# Patient Record
Sex: MALE | Race: ASIAN | NOT HISPANIC OR LATINO | URBAN - METROPOLITAN AREA
[De-identification: names, ages, dates, MRNs, and addresses within clinical notes are randomized per-mention and may not be internally consistent; named-entity substitution may affect disease eponyms.]

---

## 2021-09-25 ENCOUNTER — INPATIENT (INPATIENT)
Facility: HOSPITAL | Age: 66
LOS: 3 days | Discharge: HOME | End: 2021-09-29
Attending: INTERNAL MEDICINE | Admitting: INTERNAL MEDICINE
Payer: MEDICAID

## 2021-09-25 VITALS
DIASTOLIC BLOOD PRESSURE: 90 MMHG | HEART RATE: 70 BPM | OXYGEN SATURATION: 99 % | SYSTOLIC BLOOD PRESSURE: 160 MMHG | TEMPERATURE: 98 F | RESPIRATION RATE: 15 BRPM

## 2021-09-25 DIAGNOSIS — K21.9 GASTRO-ESOPHAGEAL REFLUX DISEASE WITHOUT ESOPHAGITIS: ICD-10-CM

## 2021-09-25 DIAGNOSIS — E78.5 HYPERLIPIDEMIA, UNSPECIFIED: ICD-10-CM

## 2021-09-25 DIAGNOSIS — Z29.9 ENCOUNTER FOR PROPHYLACTIC MEASURES, UNSPECIFIED: ICD-10-CM

## 2021-09-25 DIAGNOSIS — E11.65 TYPE 2 DIABETES MELLITUS WITH HYPERGLYCEMIA: ICD-10-CM

## 2021-09-25 DIAGNOSIS — R07.9 CHEST PAIN, UNSPECIFIED: ICD-10-CM

## 2021-09-25 DIAGNOSIS — I10 ESSENTIAL (PRIMARY) HYPERTENSION: ICD-10-CM

## 2021-09-25 LAB
ALBUMIN SERPL ELPH-MCNC: 4.4 G/DL — SIGNIFICANT CHANGE UP (ref 3.3–5)
ALP SERPL-CCNC: 52 U/L — SIGNIFICANT CHANGE UP (ref 40–120)
ALT FLD-CCNC: 25 U/L — SIGNIFICANT CHANGE UP (ref 4–41)
ANION GAP SERPL CALC-SCNC: 14 MMOL/L — SIGNIFICANT CHANGE UP (ref 7–14)
APTT BLD: 38.3 SEC — HIGH (ref 27–36.3)
AST SERPL-CCNC: 27 U/L — SIGNIFICANT CHANGE UP (ref 4–40)
BASOPHILS # BLD AUTO: 0.02 K/UL — SIGNIFICANT CHANGE UP (ref 0–0.2)
BASOPHILS NFR BLD AUTO: 0.3 % — SIGNIFICANT CHANGE UP (ref 0–2)
BILIRUB SERPL-MCNC: 0.3 MG/DL — SIGNIFICANT CHANGE UP (ref 0.2–1.2)
BUN SERPL-MCNC: 15 MG/DL — SIGNIFICANT CHANGE UP (ref 7–23)
CALCIUM SERPL-MCNC: 9.5 MG/DL — SIGNIFICANT CHANGE UP (ref 8.4–10.5)
CHLORIDE SERPL-SCNC: 102 MMOL/L — SIGNIFICANT CHANGE UP (ref 98–107)
CO2 SERPL-SCNC: 23 MMOL/L — SIGNIFICANT CHANGE UP (ref 22–31)
CREAT SERPL-MCNC: 1.14 MG/DL — SIGNIFICANT CHANGE UP (ref 0.5–1.3)
EOSINOPHIL # BLD AUTO: 0.06 K/UL — SIGNIFICANT CHANGE UP (ref 0–0.5)
EOSINOPHIL NFR BLD AUTO: 0.8 % — SIGNIFICANT CHANGE UP (ref 0–6)
GLUCOSE BLDC GLUCOMTR-MCNC: 95 MG/DL — SIGNIFICANT CHANGE UP (ref 70–99)
GLUCOSE SERPL-MCNC: 109 MG/DL — HIGH (ref 70–99)
HCT VFR BLD CALC: 46.5 % — SIGNIFICANT CHANGE UP (ref 39–50)
HGB BLD-MCNC: 15.4 G/DL — SIGNIFICANT CHANGE UP (ref 13–17)
IANC: 5.6 K/UL — SIGNIFICANT CHANGE UP (ref 1.5–8.5)
IMM GRANULOCYTES NFR BLD AUTO: 0.3 % — SIGNIFICANT CHANGE UP (ref 0–1.5)
INR BLD: 1 RATIO — SIGNIFICANT CHANGE UP (ref 0.88–1.16)
LYMPHOCYTES # BLD AUTO: 1.55 K/UL — SIGNIFICANT CHANGE UP (ref 1–3.3)
LYMPHOCYTES # BLD AUTO: 19.7 % — SIGNIFICANT CHANGE UP (ref 13–44)
MCHC RBC-ENTMCNC: 28.6 PG — SIGNIFICANT CHANGE UP (ref 27–34)
MCHC RBC-ENTMCNC: 33.1 GM/DL — SIGNIFICANT CHANGE UP (ref 32–36)
MCV RBC AUTO: 86.4 FL — SIGNIFICANT CHANGE UP (ref 80–100)
MONOCYTES # BLD AUTO: 0.61 K/UL — SIGNIFICANT CHANGE UP (ref 0–0.9)
MONOCYTES NFR BLD AUTO: 7.8 % — SIGNIFICANT CHANGE UP (ref 2–14)
NEUTROPHILS # BLD AUTO: 5.6 K/UL — SIGNIFICANT CHANGE UP (ref 1.8–7.4)
NEUTROPHILS NFR BLD AUTO: 71.1 % — SIGNIFICANT CHANGE UP (ref 43–77)
NRBC # BLD: 0 /100 WBCS — SIGNIFICANT CHANGE UP
NRBC # FLD: 0 K/UL — SIGNIFICANT CHANGE UP
NT-PROBNP SERPL-SCNC: 168 PG/ML — SIGNIFICANT CHANGE UP
PLATELET # BLD AUTO: 291 K/UL — SIGNIFICANT CHANGE UP (ref 150–400)
POTASSIUM SERPL-MCNC: 4.3 MMOL/L — SIGNIFICANT CHANGE UP (ref 3.5–5.3)
POTASSIUM SERPL-SCNC: 4.3 MMOL/L — SIGNIFICANT CHANGE UP (ref 3.5–5.3)
PROT SERPL-MCNC: 7.4 G/DL — SIGNIFICANT CHANGE UP (ref 6–8.3)
PROTHROM AB SERPL-ACNC: 11.5 SEC — SIGNIFICANT CHANGE UP (ref 10.6–13.6)
RBC # BLD: 5.38 M/UL — SIGNIFICANT CHANGE UP (ref 4.2–5.8)
RBC # FLD: 13.7 % — SIGNIFICANT CHANGE UP (ref 10.3–14.5)
SARS-COV-2 RNA SPEC QL NAA+PROBE: SIGNIFICANT CHANGE UP
SODIUM SERPL-SCNC: 139 MMOL/L — SIGNIFICANT CHANGE UP (ref 135–145)
TROPONIN T, HIGH SENSITIVITY RESULT: 26 NG/L — SIGNIFICANT CHANGE UP
TROPONIN T, HIGH SENSITIVITY RESULT: 26 NG/L — SIGNIFICANT CHANGE UP
WBC # BLD: 8.07 K/UL — SIGNIFICANT CHANGE UP (ref 3.8–10.5)
WBC # FLD AUTO: 8.07 K/UL — SIGNIFICANT CHANGE UP (ref 3.8–10.5)

## 2021-09-25 PROCEDURE — 99223 1ST HOSP IP/OBS HIGH 75: CPT

## 2021-09-25 PROCEDURE — 93010 ELECTROCARDIOGRAM REPORT: CPT

## 2021-09-25 PROCEDURE — 71045 X-RAY EXAM CHEST 1 VIEW: CPT | Mod: 26

## 2021-09-25 PROCEDURE — 99285 EMERGENCY DEPT VISIT HI MDM: CPT | Mod: 25

## 2021-09-25 RX ORDER — ASPIRIN/CALCIUM CARB/MAGNESIUM 324 MG
162 TABLET ORAL ONCE
Refills: 0 | Status: COMPLETED | OUTPATIENT
Start: 2021-09-25 | End: 2021-09-25

## 2021-09-25 RX ORDER — SODIUM CHLORIDE 9 MG/ML
1000 INJECTION, SOLUTION INTRAVENOUS
Refills: 0 | Status: DISCONTINUED | OUTPATIENT
Start: 2021-09-25 | End: 2021-09-29

## 2021-09-25 RX ORDER — FAMOTIDINE 10 MG/ML
20 INJECTION INTRAVENOUS
Refills: 0 | Status: DISCONTINUED | OUTPATIENT
Start: 2021-09-25 | End: 2021-09-29

## 2021-09-25 RX ORDER — INSULIN LISPRO 100/ML
VIAL (ML) SUBCUTANEOUS AT BEDTIME
Refills: 0 | Status: DISCONTINUED | OUTPATIENT
Start: 2021-09-25 | End: 2021-09-26

## 2021-09-25 RX ORDER — DEXTROSE 50 % IN WATER 50 %
15 SYRINGE (ML) INTRAVENOUS ONCE
Refills: 0 | Status: DISCONTINUED | OUTPATIENT
Start: 2021-09-25 | End: 2021-09-29

## 2021-09-25 RX ORDER — ASPIRIN/CALCIUM CARB/MAGNESIUM 324 MG
81 TABLET ORAL AT BEDTIME
Refills: 0 | Status: DISCONTINUED | OUTPATIENT
Start: 2021-09-26 | End: 2021-09-29

## 2021-09-25 RX ORDER — ATORVASTATIN CALCIUM 80 MG/1
40 TABLET, FILM COATED ORAL AT BEDTIME
Refills: 0 | Status: DISCONTINUED | OUTPATIENT
Start: 2021-09-25 | End: 2021-09-27

## 2021-09-25 RX ORDER — ACETAMINOPHEN 500 MG
650 TABLET ORAL EVERY 6 HOURS
Refills: 0 | Status: DISCONTINUED | OUTPATIENT
Start: 2021-09-25 | End: 2021-09-29

## 2021-09-25 RX ORDER — DEXTROSE 50 % IN WATER 50 %
25 SYRINGE (ML) INTRAVENOUS ONCE
Refills: 0 | Status: DISCONTINUED | OUTPATIENT
Start: 2021-09-25 | End: 2021-09-29

## 2021-09-25 RX ORDER — ASPIRIN/CALCIUM CARB/MAGNESIUM 324 MG
1 TABLET ORAL
Qty: 0 | Refills: 0 | DISCHARGE

## 2021-09-25 RX ORDER — ENOXAPARIN SODIUM 100 MG/ML
40 INJECTION SUBCUTANEOUS DAILY
Refills: 0 | Status: DISCONTINUED | OUTPATIENT
Start: 2021-09-25 | End: 2021-09-29

## 2021-09-25 RX ORDER — INFLUENZA VIRUS VACCINE 15; 15; 15; 15 UG/.5ML; UG/.5ML; UG/.5ML; UG/.5ML
0.5 SUSPENSION INTRAMUSCULAR ONCE
Refills: 0 | Status: DISCONTINUED | OUTPATIENT
Start: 2021-09-25 | End: 2021-09-29

## 2021-09-25 RX ORDER — DEXTROSE 50 % IN WATER 50 %
12.5 SYRINGE (ML) INTRAVENOUS ONCE
Refills: 0 | Status: DISCONTINUED | OUTPATIENT
Start: 2021-09-25 | End: 2021-09-29

## 2021-09-25 RX ORDER — INSULIN LISPRO 100/ML
VIAL (ML) SUBCUTANEOUS
Refills: 0 | Status: DISCONTINUED | OUTPATIENT
Start: 2021-09-25 | End: 2021-09-26

## 2021-09-25 RX ORDER — GLUCAGON INJECTION, SOLUTION 0.5 MG/.1ML
1 INJECTION, SOLUTION SUBCUTANEOUS ONCE
Refills: 0 | Status: DISCONTINUED | OUTPATIENT
Start: 2021-09-25 | End: 2021-09-29

## 2021-09-25 RX ORDER — ATENOLOL 25 MG/1
50 TABLET ORAL DAILY
Refills: 0 | Status: DISCONTINUED | OUTPATIENT
Start: 2021-09-25 | End: 2021-09-29

## 2021-09-25 RX ORDER — LOSARTAN POTASSIUM 100 MG/1
50 TABLET, FILM COATED ORAL DAILY
Refills: 0 | Status: DISCONTINUED | OUTPATIENT
Start: 2021-09-25 | End: 2021-09-29

## 2021-09-25 RX ADMIN — Medication 162 MILLIGRAM(S): at 15:57

## 2021-09-25 NOTE — H&P ADULT - NSHPREVIEWOFSYSTEMS_GEN_ALL_CORE
ROS:    Constitutional: [ ] fevers [ ] chills   HEENT: [ ] dry eyes [ ] eye irritation [ ] postnasal drip   CV: [x ] chest pain [ ] orthopnea [ ] palpitations   Resp: [ ] cough [ ] shortness of breath [ ] dyspnea   GI: [ ] nausea [ ] vomiting [ ] diarrhea [ ] constipation [ ] abd pain   : [ ] dysuria [ ] nocturia [ ] hematuria [ ] increased urinary frequency  Musculoskeletal: [ ] back pain [ ] myalgias [ ] arthralgias [ ] fracture  Skin: [ ] rash [ ] itch  Neurological: [ ] headache [ ] dizziness [ ] syncope   Endocrine: [ ] diabetes [ ] thyroid problem  Hematologic/Lymphatic: [ ] anemia [ ] bleeding problem  Allergic/Immunologic: [ ] itchy eyes [ ] nasal discharge   [ x] All other systems negative

## 2021-09-25 NOTE — ED PROVIDER NOTE - ATTENDING CONTRIBUTION TO CARE
66M p/w SSCP worse on exertion and rad down L arm, impv with rest.  no fever or cough.  Reports had ST years ago in [facility].  PMHX DM HTN HLD.  No PARADISE on EKG.  Pt not in distress.  However given typical symtpoms and multiple risk factors will admit for ACS eval.  VS:  unremarkable except HTN    GEN - NAD;   well appearing;   A+O x3   HEAD - NC/AT     ENT - PEERL, EOMI, mucous membranes    moist , no discharge      NECK: Neck supple, non-tender without lymphadenopathy, no masses, no JVD  PULM - CTA b/l,  symmetric breath sounds  COR -  normal heart sounds    ABD - , ND, NT, soft,  BACK - no CVA tenderness, nontender spine     EXTREMS - no edema, no deformity, warm and well perfused    SKIN - no rash    or bruising      NEUROLOGIC - alert, face symmetric, speech fluent, sensation nl, motor no focal deficit.

## 2021-09-25 NOTE — H&P ADULT - NSHPLABSRESULTS_GEN_ALL_CORE
I have personally reviewed this patient's labs below:                        15.4   8.07  )-----------( 291      ( 25 Sep 2021 15:52 )             46.5     09-25-21 @ 15:55    139  |  102  |  15             --------------------------< 109<H>     4.3  |  23  | 1.14    eGFR AA: 78  eGFR N-AA: 68    Calcium: 9.5  Phosphorus: --  Magnesium: --    AST: 27    ALT: 25  AlkPhos: 52  Protein: 7.4  Albumin: 4.4  TBili: 0.3  D-Bili: --      Troponin 26--26  Probnp 168  COVID negative    I have personally reviewed this patient's EKG and my independent interpretation is NSR, LVH, biphasic T waves in V2-V5    I have personally reviewed this patient's CXR and my independent interpretation is clear lungs

## 2021-09-25 NOTE — ED PROVIDER NOTE - PROGRESS NOTE DETAILS
Simon Gaytan, PGY3: Initial trop 26, delta trop ordered. Patient endorsed to Dr. RAMON Moran (tele doc of the day). Patient admitted to tele.

## 2021-09-25 NOTE — H&P ADULT - PROBLEM SELECTOR PLAN 1
Typical chest pain with exertion and relieved with rest. Has indeterminant troponins but without significant delta. EKG NSR, LVH, biphasic T waves in V2-V5  Given comorbid conditions age, male, HTN, HLD, DM will require ischemic evaluation  -monitor on tele  -TTE  -ischemic evaluation per primary cardiology team in AM. Likely will require stress test  -add on A1C, TSH, lipids

## 2021-09-25 NOTE — ED PROVIDER NOTE - PHYSICAL EXAMINATION
GENERAL: A&Ox3, non-toxic appearing, no acute distress  HEENT: NCAT, EOMI, oral mucosa moist, normal conjunctiva  RESP: CTAB, no respiratory distress, no wheezes/rhonchi/rales, speaking in full sentences  CV: RRR, no murmurs/rubs/gallops, no JVD, no peripheral edema  ABDOMEN: soft, non-tender, non-distended, no guarding  MSK: no visible deformities, no chest wall tenderness  NEURO: no focal sensory or motor deficits, CN 2-12 grossly intact  SKIN: warm, normal color, well perfused, no rash  PSYCH: normal affect

## 2021-09-25 NOTE — H&P ADULT - NSHPPHYSICALEXAM_GEN_ALL_CORE
PHYSICAL EXAM:  Vital Signs Last 24 Hrs  T(C): 36.8 (09-25-21 @ 19:05)  T(F): 98.2 (09-25-21 @ 19:05), Max: 98.2 (09-25-21 @ 19:05)  HR: 74 (09-25-21 @ 19:05) (70 - 81)  BP: 155/86 (09-25-21 @ 19:05)  BP(mean): 105 (09-25-21 @ 19:05) (105 - 105)  RR: 19 (09-25-21 @ 19:05) (15 - 19)  SpO2: 100% (09-25-21 @ 19:05) (99% - 100%)  Wt(kg): --    Constitutional: NAD, awake and alert, well developed  EYES: EOMI, conjunctiva clear  ENT:  Normal Hearing, no tonsillar exudates   Neck: Soft and supple , no thyromegaly   Respiratory: Breath sounds are clear bilaterally, No wheezing, rales or rhonchi, no tachypnea, no accessory muscle use  Cardiovascular: S1 and S2, regular rate and rhythm, no Murmurs, gallops or rubs, no JVD, no leg edema  Gastrointestinal: Bowel Sounds present, soft, nontender, nondistended, no guarding, no rebound  Extremities: No cyanosis or clubbing; warm to touch  Vascular: 2+ peripheral pulses lower ex  Neurological: No focal deficits, CN II-XII intact bilaterally, sensation to light touch intact in all extremities. Pupils are equally reactive to light and symmetrical in size.   Musculoskeletal: 5/5 strength b/l upper and lower extremities; no joint swelling.  Skin: No rashes, no ulcerations

## 2021-09-25 NOTE — H&P ADULT - NSICDXPASTMEDICALHX_GEN_ALL_CORE_FT
PAST MEDICAL HISTORY:  Diabetes mellitus     GERD (gastroesophageal reflux disease)     HLD (hyperlipidemia)     HTN (hypertension)     
Asthma    HPV in female    Hypertension    Infertility, female    Morbid obesity    Polyp of female genital tract    Seasonal allergies    Uterine polyp

## 2021-09-25 NOTE — ED PROVIDER NOTE - CLINICAL SUMMARY MEDICAL DECISION MAKING FREE TEXT BOX
Simon Gaytan, PGY3: 66 year old male p/w exertional dyspnea and chest pain x2 days, relieved w/ rest. Non-pleuritic. No associated sx. Last stress many years ago, does not follow w/ cards. Hypertensive, ekg non-ischemic. C/f unstable angina. Plan for labs, cxr, admit for stress vs cath.

## 2021-09-25 NOTE — H&P ADULT - HISTORY OF PRESENT ILLNESS
66M with PMHx HTN, HLD, DM controlled on metformin, GERD presenting with chest pain x4 days. Chest pain is primarily exertional, comes and goes, substernal and described as a burning sensation. This feels different than usual acid reflux. Onset of chest pain is about 5-10 minutes after walking. It then takes about 5 minutes to subside with rest or lying down. Chest pain is mild, radiates to left arm as well. Denies prior history of CP, MI, stroke, arrhythmia, CHF. States last stress was 20 years ago. Denies LEIJA, SOB, LE edema, orthopnea, LOC, LH. Patient saw PMD today who sent him in for further w/u of chest pain    In ED, trops 26--26. EKG NSR, LVH, biphasic V2-V5  Chest pain now resolved

## 2021-09-25 NOTE — ED PROVIDER NOTE - OBJECTIVE STATEMENT
66 year old male PMH HTN, HLD, DM, presents to ED for chest pain. Patient states when walking short distances, he is experiencing mid-sternal chest "burning" pain that radiates down L arm. Pain will relieve itself upon resting. This symptom is new as of the past 2 days. Pain is non-pleuritic. He had stress test "many years ago" in [facility], does not follow with cardiology. He denies fever, chills, cough, abd pain, n/v, or back pain.

## 2021-09-25 NOTE — ED ADULT NURSE NOTE - OBJECTIVE STATEMENT
Pt received to room trauma C. Pt with hx of HTN, HLD, DM, c/o dyspnea & chest pain on exertion since this past Wednesday. States a "burning chest pain" feeling starts when he is "having a lot of movement and working hard," resolves after resting. States he does not feel pain when sitting, standing in place, and sleeping. Respirations are even & unlabored, lung sounds clear, heart sounds normal, abdomen is soft, non-distended. Pt currently chest pain & dyspnea (at this time), fevers, chills, weakness, dizziness, headache, palpitations, cough. 20 G IV Placed to right AC. Pt is A&Ox4, ambulates independently.

## 2021-09-25 NOTE — ED PROVIDER NOTE - NSICDXPASTMEDICALHX_GEN_ALL_CORE_FT
PAST MEDICAL HISTORY:  Diabetes mellitus     GERD (gastroesophageal reflux disease)     HLD (hyperlipidemia)     HTN (hypertension)

## 2021-09-25 NOTE — H&P ADULT - ASSESSMENT
66M with PMHx HTN, HLD, DM controlled on metformin, GERD presenting with chest pain x4 days. Exertional with typical symptoms.

## 2021-09-25 NOTE — ED ADULT NURSE NOTE - CHIEF COMPLAINT QUOTE
Please notify that child's COVID-19 test was NEGATIVE, continue quarantine and asthma treatment for rest of this week and RTC if not improved pt c/o exertional chest pain x 3 days, radiating to the L arm.

## 2021-09-26 LAB
A1C WITH ESTIMATED AVERAGE GLUCOSE RESULT: 6.1 % — HIGH (ref 4–5.6)
CHOLEST SERPL-MCNC: 169 MG/DL — SIGNIFICANT CHANGE UP
COVID-19 SPIKE DOMAIN AB INTERP: POSITIVE
COVID-19 SPIKE DOMAIN ANTIBODY RESULT: >250 U/ML — HIGH
ESTIMATED AVERAGE GLUCOSE: 128 — SIGNIFICANT CHANGE UP
GLUCOSE BLDC GLUCOMTR-MCNC: 103 MG/DL — HIGH (ref 70–99)
GLUCOSE BLDC GLUCOMTR-MCNC: 108 MG/DL — HIGH (ref 70–99)
GLUCOSE BLDC GLUCOMTR-MCNC: 90 MG/DL — SIGNIFICANT CHANGE UP (ref 70–99)
GLUCOSE BLDC GLUCOMTR-MCNC: 93 MG/DL — SIGNIFICANT CHANGE UP (ref 70–99)
HDLC SERPL-MCNC: 55 MG/DL — SIGNIFICANT CHANGE UP
LIPID PNL WITH DIRECT LDL SERPL: 94 MG/DL — SIGNIFICANT CHANGE UP
NON HDL CHOLESTEROL: 114 MG/DL — SIGNIFICANT CHANGE UP
NT-PROBNP SERPL-SCNC: 179 PG/ML — SIGNIFICANT CHANGE UP
SARS-COV-2 IGG+IGM SERPL QL IA: >250 U/ML — HIGH
SARS-COV-2 IGG+IGM SERPL QL IA: POSITIVE
TRIGL SERPL-MCNC: 101 MG/DL — SIGNIFICANT CHANGE UP
TSH SERPL-MCNC: 2.22 UIU/ML — SIGNIFICANT CHANGE UP (ref 0.27–4.2)

## 2021-09-26 RX ORDER — INSULIN LISPRO 100/ML
VIAL (ML) SUBCUTANEOUS EVERY 6 HOURS
Refills: 0 | Status: DISCONTINUED | OUTPATIENT
Start: 2021-09-26 | End: 2021-09-27

## 2021-09-26 RX ADMIN — Medication 81 MILLIGRAM(S): at 22:03

## 2021-09-26 RX ADMIN — LOSARTAN POTASSIUM 50 MILLIGRAM(S): 100 TABLET, FILM COATED ORAL at 05:27

## 2021-09-26 RX ADMIN — FAMOTIDINE 20 MILLIGRAM(S): 10 INJECTION INTRAVENOUS at 16:44

## 2021-09-26 RX ADMIN — ENOXAPARIN SODIUM 40 MILLIGRAM(S): 100 INJECTION SUBCUTANEOUS at 09:33

## 2021-09-26 RX ADMIN — FAMOTIDINE 20 MILLIGRAM(S): 10 INJECTION INTRAVENOUS at 05:28

## 2021-09-26 RX ADMIN — ATENOLOL 50 MILLIGRAM(S): 25 TABLET ORAL at 05:27

## 2021-09-26 RX ADMIN — ATORVASTATIN CALCIUM 40 MILLIGRAM(S): 80 TABLET, FILM COATED ORAL at 22:03

## 2021-09-27 LAB
ANION GAP SERPL CALC-SCNC: 14 MMOL/L — SIGNIFICANT CHANGE UP (ref 7–14)
BUN SERPL-MCNC: 19 MG/DL — SIGNIFICANT CHANGE UP (ref 7–23)
CALCIUM SERPL-MCNC: 9.9 MG/DL — SIGNIFICANT CHANGE UP (ref 8.4–10.5)
CHLORIDE SERPL-SCNC: 100 MMOL/L — SIGNIFICANT CHANGE UP (ref 98–107)
CO2 SERPL-SCNC: 24 MMOL/L — SIGNIFICANT CHANGE UP (ref 22–31)
CREAT SERPL-MCNC: 1.1 MG/DL — SIGNIFICANT CHANGE UP (ref 0.5–1.3)
GLUCOSE BLDC GLUCOMTR-MCNC: 102 MG/DL — HIGH (ref 70–99)
GLUCOSE BLDC GLUCOMTR-MCNC: 102 MG/DL — HIGH (ref 70–99)
GLUCOSE BLDC GLUCOMTR-MCNC: 105 MG/DL — HIGH (ref 70–99)
GLUCOSE BLDC GLUCOMTR-MCNC: 106 MG/DL — HIGH (ref 70–99)
GLUCOSE BLDC GLUCOMTR-MCNC: 85 MG/DL — SIGNIFICANT CHANGE UP (ref 70–99)
GLUCOSE BLDC GLUCOMTR-MCNC: 99 MG/DL — SIGNIFICANT CHANGE UP (ref 70–99)
GLUCOSE SERPL-MCNC: 116 MG/DL — HIGH (ref 70–99)
HCT VFR BLD CALC: 48.6 % — SIGNIFICANT CHANGE UP (ref 39–50)
HCV AB S/CO SERPL IA: 0.42 S/CO — SIGNIFICANT CHANGE UP (ref 0–0.99)
HCV AB SERPL-IMP: SIGNIFICANT CHANGE UP
HGB BLD-MCNC: 15.9 G/DL — SIGNIFICANT CHANGE UP (ref 13–17)
MCHC RBC-ENTMCNC: 28.5 PG — SIGNIFICANT CHANGE UP (ref 27–34)
MCHC RBC-ENTMCNC: 32.7 GM/DL — SIGNIFICANT CHANGE UP (ref 32–36)
MCV RBC AUTO: 87.3 FL — SIGNIFICANT CHANGE UP (ref 80–100)
NRBC # BLD: 0 /100 WBCS — SIGNIFICANT CHANGE UP
NRBC # FLD: 0 K/UL — SIGNIFICANT CHANGE UP
PLATELET # BLD AUTO: 285 K/UL — SIGNIFICANT CHANGE UP (ref 150–400)
POTASSIUM SERPL-MCNC: 5.3 MMOL/L — SIGNIFICANT CHANGE UP (ref 3.5–5.3)
POTASSIUM SERPL-SCNC: 5.3 MMOL/L — SIGNIFICANT CHANGE UP (ref 3.5–5.3)
RBC # BLD: 5.57 M/UL — SIGNIFICANT CHANGE UP (ref 4.2–5.8)
RBC # FLD: 13.9 % — SIGNIFICANT CHANGE UP (ref 10.3–14.5)
SODIUM SERPL-SCNC: 138 MMOL/L — SIGNIFICANT CHANGE UP (ref 135–145)
WBC # BLD: 8.15 K/UL — SIGNIFICANT CHANGE UP (ref 3.8–10.5)
WBC # FLD AUTO: 8.15 K/UL — SIGNIFICANT CHANGE UP (ref 3.8–10.5)

## 2021-09-27 PROCEDURE — 99152 MOD SED SAME PHYS/QHP 5/>YRS: CPT

## 2021-09-27 PROCEDURE — 92978 ENDOLUMINL IVUS OCT C 1ST: CPT | Mod: 26,LD

## 2021-09-27 PROCEDURE — 93458 L HRT ARTERY/VENTRICLE ANGIO: CPT | Mod: 26,59

## 2021-09-27 PROCEDURE — 92941 PRQ TRLML REVSC TOT OCCL AMI: CPT | Mod: LD

## 2021-09-27 PROCEDURE — 93010 ELECTROCARDIOGRAM REPORT: CPT

## 2021-09-27 RX ORDER — TICAGRELOR 90 MG/1
1 TABLET ORAL
Qty: 60 | Refills: 0
Start: 2021-09-27 | End: 2021-10-26

## 2021-09-27 RX ORDER — TICAGRELOR 90 MG/1
90 TABLET ORAL EVERY 12 HOURS
Refills: 0 | Status: DISCONTINUED | OUTPATIENT
Start: 2021-09-28 | End: 2021-09-29

## 2021-09-27 RX ORDER — ATORVASTATIN CALCIUM 80 MG/1
80 TABLET, FILM COATED ORAL AT BEDTIME
Refills: 0 | Status: DISCONTINUED | OUTPATIENT
Start: 2021-09-27 | End: 2021-09-29

## 2021-09-27 RX ORDER — INSULIN LISPRO 100/ML
VIAL (ML) SUBCUTANEOUS
Refills: 0 | Status: DISCONTINUED | OUTPATIENT
Start: 2021-09-27 | End: 2021-09-29

## 2021-09-27 RX ADMIN — LOSARTAN POTASSIUM 50 MILLIGRAM(S): 100 TABLET, FILM COATED ORAL at 05:42

## 2021-09-27 RX ADMIN — Medication 650 MILLIGRAM(S): at 22:06

## 2021-09-27 RX ADMIN — ATENOLOL 50 MILLIGRAM(S): 25 TABLET ORAL at 05:42

## 2021-09-27 RX ADMIN — ATORVASTATIN CALCIUM 80 MILLIGRAM(S): 80 TABLET, FILM COATED ORAL at 22:06

## 2021-09-27 RX ADMIN — FAMOTIDINE 20 MILLIGRAM(S): 10 INJECTION INTRAVENOUS at 05:42

## 2021-09-27 RX ADMIN — FAMOTIDINE 20 MILLIGRAM(S): 10 INJECTION INTRAVENOUS at 19:08

## 2021-09-27 NOTE — PROVIDER CONTACT NOTE (OTHER) - BACKGROUND
Pt presented to ED with chest pain for 4 days on exertion. Pmh of diabetes, HTN, HLD.
66 yr old female presented to ED with chest pain for 4 days on exertion. Pt is to go for a cath procedure in afternoon.

## 2021-09-27 NOTE — PROVIDER CONTACT NOTE (OTHER) - ASSESSMENT
Pts /85. HR 68. Pt is AxOx4. Pt denies chest pain, SOB, or palpitations.
Pts BP is 160/98 and HR 63.Pt is AxOx4. Pt has no complaints of headache, SOB, or chest pain.

## 2021-09-28 LAB
ANION GAP SERPL CALC-SCNC: 13 MMOL/L — SIGNIFICANT CHANGE UP (ref 7–14)
BUN SERPL-MCNC: 24 MG/DL — HIGH (ref 7–23)
CALCIUM SERPL-MCNC: 9.1 MG/DL — SIGNIFICANT CHANGE UP (ref 8.4–10.5)
CHLORIDE SERPL-SCNC: 105 MMOL/L — SIGNIFICANT CHANGE UP (ref 98–107)
CO2 SERPL-SCNC: 23 MMOL/L — SIGNIFICANT CHANGE UP (ref 22–31)
CREAT SERPL-MCNC: 1.16 MG/DL — SIGNIFICANT CHANGE UP (ref 0.5–1.3)
GLUCOSE BLDC GLUCOMTR-MCNC: 101 MG/DL — HIGH (ref 70–99)
GLUCOSE BLDC GLUCOMTR-MCNC: 102 MG/DL — HIGH (ref 70–99)
GLUCOSE BLDC GLUCOMTR-MCNC: 107 MG/DL — HIGH (ref 70–99)
GLUCOSE BLDC GLUCOMTR-MCNC: 96 MG/DL — SIGNIFICANT CHANGE UP (ref 70–99)
GLUCOSE SERPL-MCNC: 96 MG/DL — SIGNIFICANT CHANGE UP (ref 70–99)
HCT VFR BLD CALC: 45.3 % — SIGNIFICANT CHANGE UP (ref 39–50)
HGB BLD-MCNC: 15.2 G/DL — SIGNIFICANT CHANGE UP (ref 13–17)
MAGNESIUM SERPL-MCNC: 2.3 MG/DL — SIGNIFICANT CHANGE UP (ref 1.6–2.6)
MCHC RBC-ENTMCNC: 29.2 PG — SIGNIFICANT CHANGE UP (ref 27–34)
MCHC RBC-ENTMCNC: 33.6 GM/DL — SIGNIFICANT CHANGE UP (ref 32–36)
MCV RBC AUTO: 87.1 FL — SIGNIFICANT CHANGE UP (ref 80–100)
NRBC # BLD: 0 /100 WBCS — SIGNIFICANT CHANGE UP
NRBC # FLD: 0 K/UL — SIGNIFICANT CHANGE UP
PHOSPHATE SERPL-MCNC: 3.4 MG/DL — SIGNIFICANT CHANGE UP (ref 2.5–4.5)
PLATELET # BLD AUTO: 269 K/UL — SIGNIFICANT CHANGE UP (ref 150–400)
POTASSIUM SERPL-MCNC: 3.9 MMOL/L — SIGNIFICANT CHANGE UP (ref 3.5–5.3)
POTASSIUM SERPL-SCNC: 3.9 MMOL/L — SIGNIFICANT CHANGE UP (ref 3.5–5.3)
RBC # BLD: 5.2 M/UL — SIGNIFICANT CHANGE UP (ref 4.2–5.8)
RBC # FLD: 13.8 % — SIGNIFICANT CHANGE UP (ref 10.3–14.5)
SODIUM SERPL-SCNC: 141 MMOL/L — SIGNIFICANT CHANGE UP (ref 135–145)
WBC # BLD: 10.93 K/UL — HIGH (ref 3.8–10.5)
WBC # FLD AUTO: 10.93 K/UL — HIGH (ref 3.8–10.5)

## 2021-09-28 PROCEDURE — 93306 TTE W/DOPPLER COMPLETE: CPT | Mod: 26

## 2021-09-28 RX ORDER — METFORMIN HYDROCHLORIDE 850 MG/1
1 TABLET ORAL
Qty: 0 | Refills: 0 | DISCHARGE

## 2021-09-28 RX ADMIN — Medication 81 MILLIGRAM(S): at 21:06

## 2021-09-28 RX ADMIN — FAMOTIDINE 20 MILLIGRAM(S): 10 INJECTION INTRAVENOUS at 06:25

## 2021-09-28 RX ADMIN — ATENOLOL 50 MILLIGRAM(S): 25 TABLET ORAL at 17:30

## 2021-09-28 RX ADMIN — LOSARTAN POTASSIUM 50 MILLIGRAM(S): 100 TABLET, FILM COATED ORAL at 17:30

## 2021-09-28 RX ADMIN — ENOXAPARIN SODIUM 40 MILLIGRAM(S): 100 INJECTION SUBCUTANEOUS at 11:20

## 2021-09-28 RX ADMIN — TICAGRELOR 90 MILLIGRAM(S): 90 TABLET ORAL at 06:25

## 2021-09-28 RX ADMIN — FAMOTIDINE 20 MILLIGRAM(S): 10 INJECTION INTRAVENOUS at 17:30

## 2021-09-28 RX ADMIN — TICAGRELOR 90 MILLIGRAM(S): 90 TABLET ORAL at 17:30

## 2021-09-28 RX ADMIN — ATORVASTATIN CALCIUM 80 MILLIGRAM(S): 80 TABLET, FILM COATED ORAL at 21:06

## 2021-09-28 NOTE — DISCHARGE NOTE PROVIDER - CARE PROVIDER_API CALL
Chris Moran)  Cardiology  [address] [place] [address]  Phone: [phone]  Fax: [phone]  Follow Up Time: 1 week

## 2021-09-28 NOTE — DISCHARGE NOTE PROVIDER - HOSPITAL COURSE
66M with PMHx HTN, HLD, DM controlled on metformin, GERD presenting with chest pain x4 days. Exertional with typical symptoms.    + Chest pain  9/27- LHC: pLAD 99% x1 GILLIAN, EF 45%, LVEDP 20, RRA accessed, ASA+Brilinta    Dispo: Stable for DC 9/28.  Pt. cannot leave till AM 66 year old male with PMHx HTN, HLD, DM controlled on metformin, GERD presenting with chest pain x4 days. Exertional with typical symptoms.  Patient admitted to telemetry service to rule out ACS.  EKG w/o ischemic changes, high sensitivity troponins were negative andpatient had echocardiogram with EF of 69%.  Due to risk factors patient underwent coronary angiography on 9/27 : pLAD 99% x1 GILLIAN, EF 45%, LVEDP 20, RRA accessed, ASA+Brilinta started.  Losartan & atenolol continued for hypertension.  A1C 6.1, inpatient DM managed with blood glucose monitoring and admelog sliding scale.      Dispo: Stable for DC 9/28, discussed with Dr. Moran, all prescriptions sent to patients preferred pharmacy,    Right radial cath site assessed prior to discharge, area soft, non-tender without hematoma noted, sensation intact +2 bilateral radial pulses present. 66 year old male with PMHx HTN, HLD, DM controlled on metformin, GERD presenting with chest pain x4 days. Exertional with typical symptoms.  Patient admitted to telemetry service to rule out ACS. ndeterminant troponins but without significant delta. EKG NSR, LVH, biphasic T waves in V2-V5, patient had echocardiogram with EF of 69%.  Due to risk factors patient underwent coronary angiography on 9/27 : pLAD 99% x1 GILLIAN, EF 45%, LVEDP 20, RRA accessed, ASA+Brilinta started.  Losartan & atenolol continued for hypertension.  A1C 6.1, inpatient DM managed with blood glucose monitoring and admelog sliding scale.      Dispo: Stable for DC 9/28, discussed with Dr. Moran, all prescriptions sent to patients preferred pharmacy,    Right radial cath site assessed prior to discharge, area soft, non-tender without hematoma noted, sensation intact +2 bilateral radial pulses present.

## 2021-09-28 NOTE — DISCHARGE NOTE PROVIDER - NSDCACTIVITY_GEN_ALL_CORE
No heavy lifting greater than 5-10 pounds with right wrist x one week. No strenuous activity x 3 weeks. Monitor site of procedure and notify your doctor for any redness, swelling, discharge/Follow Instructions Provided by your Surgical Team

## 2021-09-28 NOTE — DISCHARGE NOTE PROVIDER - NSDCMRMEDTOKEN_GEN_ALL_CORE_FT
aspirin 81 mg oral tablet, chewable: 1 tab(s) orally once a day (at bedtime)  atenolol 50 mg oral tablet: 1 tab(s) orally once a day  atorvastatin 40 mg oral tablet: 1 tab(s) orally once a day  famotidine 20 mg oral tablet: 1 tab(s) orally 2 times a day  losartan 50 mg oral tablet: 1 tab(s) orally once a day  metFORMIN 500 mg oral tablet, extended release: 1 tab(s) orally once a day    DO NOT start till 3/30 Thursday  ticagrelor 90 mg oral tablet: 1 tab(s) orally 2 times a day    aspirin 81 mg oral tablet, chewable: 1 tab(s) orally once a day (at bedtime)  atenolol 50 mg oral tablet: 1 tab(s) orally once a day  atorvastatin 40 mg oral tablet: 1 tab(s) orally once a day  famotidine 20 mg oral tablet: 1 tab(s) orally 2 times a day  losartan 50 mg oral tablet: 1 tab(s) orally once a day  metFORMIN 500 mg oral tablet, extended release: 1 tab(s) orally once a day    DO NOT start till 9/30 Thursday  ticagrelor 90 mg oral tablet: 1 tab(s) orally 2 times a day    aspirin 81 mg oral tablet, chewable: 1 tab(s) orally once a day (at bedtime)  atenolol 50 mg oral tablet: 1 tab(s) orally once a day  atorvastatin 40 mg oral tablet: 1 tab(s) orally once a day  famotidine 20 mg oral tablet: 1 tab(s) orally 2 times a day  losartan 50 mg oral tablet: 1 tab(s) orally once a day  metFORMIN 500 mg oral tablet, extended release: 1 tab(s) orally once a day    DO NOT start till 9/30 Thursday  ticagrelor 90 mg oral tablet: 1 tab(s) orally 2 times a day     Price check-pls page [id number] w/price   aspirin 81 mg oral tablet, chewable: 1 tab(s) orally once a day (at bedtime)  atenolol 50 mg oral tablet: 1 tab(s) orally once a day  atorvastatin 40 mg oral tablet: 1 tab(s) orally once a day  famotidine 20 mg oral tablet: 1 tab(s) orally 2 times a day  losartan 50 mg oral tablet: 1 tab(s) orally once a day  metFORMIN 500 mg oral tablet, extended release: 1 tab(s) orally once a day    DO NOT start till 9/30 Thursday  ticagrelor 90 mg oral tablet: 1 tab(s) orally 2 times a day

## 2021-09-28 NOTE — DISCHARGE NOTE PROVIDER - NSDCCPCAREPLAN_GEN_ALL_CORE_FT
PRINCIPAL DISCHARGE DIAGNOSIS  Diagnosis: Coronary artery disease with angina pectoris  Assessment and Plan of Treatment: Continue aspirin and Brilinta, do not stop unless instructed by your physician.  Continue low salt, fat, cholesterol and carbohydrate diet. Follow up with cardiologist and primary care physician's routine appointment.      SECONDARY DISCHARGE DIAGNOSES  Diagnosis: HLD (hyperlipidemia)  Assessment and Plan of Treatment: Low fat diet, exercise daily and continue current medications. Follow up with primary care physician and cardiologist for management.    Diagnosis: HTN (hypertension)  Assessment and Plan of Treatment: Low sodium and fat diet, continue anti-hypertensive medications, and follow up with primary care physician.    Diagnosis: Type 2 diabetes mellitus with hyperglycemia  Assessment and Plan of Treatment: Monitor finger sticks pre-meal and bedtime, low salt, fat and carbohydrate diet, minimize glucose intake.  Exercise daily for at least 30 minutes and weight loss.  Follow up with primary care physician and endocrinologist for routine Hemoglobin A1C checks and management.  Follow up with your ophthalmologist for routine yearly vision exams.    Diagnosis: GERD (gastroesophageal reflux disease)  Assessment and Plan of Treatment: Avoid fatty, fried foods, acidic foods such as tomatoes, lime and chocolate. Continue to take your medications as prescribed, exercise daily and weight loss.     PRINCIPAL DISCHARGE DIAGNOSIS  Diagnosis: Coronary artery disease with angina pectoris  Assessment and Plan of Treatment: Continue aspirin and Brilinta, do not stop unless instructed by your physician.  Continue low salt, fat, cholesterol and carbohydrate diet. Follow up with cardiologist and primary care physician's routine appointment.      SECONDARY DISCHARGE DIAGNOSES  Diagnosis: GERD (gastroesophageal reflux disease)  Assessment and Plan of Treatment: Avoid fatty, fried foods, acidic foods such as tomatoes, lime and chocolate. Continue to take your medications as prescribed, exercise daily and weight loss.    Diagnosis: Type 2 diabetes mellitus with hyperglycemia  Assessment and Plan of Treatment: Monitor finger sticks pre-meal and bedtime, low salt, fat and carbohydrate diet, minimize glucose intake.  Exercise daily for at least 30 minutes and weight loss.  Follow up with primary care physician and endocrinologist for routine Hemoglobin A1C checks and management.  Follow up with your ophthalmologist for routine yearly vision exams.    Diagnosis: HTN (hypertension)  Assessment and Plan of Treatment: Low sodium and fat diet, continue anti-hypertensive medications, and follow up with primary care physician.    Diagnosis: HLD (hyperlipidemia)  Assessment and Plan of Treatment: Low fat diet, exercise daily and continue current medications. Follow up with primary care physician and cardiologist for management.    Diagnosis: S/P coronary angiogram  Assessment and Plan of Treatment: No heavy lifting greater than 5-10 pounds with right wrist x one week. No strenuous activity x 3 weeks. Monitor site of procedure and notify your doctor for any redness, swelling, discharge!  You had an angiogram which showed you had a blockage in your heart and a stent was placed.  Keep your stent card in a safe place as it will be needed in the future.  Follow up with your cardiologist within 1 week of discharge home.

## 2021-09-28 NOTE — CHART NOTE - NSCHARTNOTEFT_GEN_A_CORE
Post cath RRA without hematoma or bleeding.  R hand warm and with good capillary refill without cyanosis.
[address] 66yrs s/p cardiac cath via right radial access.  Site is stable with no hematoma, active bleed or swelling.  Dressing is clean/dry/intact.  DP pulse is palpable.  Patient denies pain, numbness, tingling, CP, SOB. VSS. Will continue to monitor.     T(C): 36.4 (09-28-21 @ 01:33), Max: 36.7 (09-27-21 @ 18:55)  HR: 62 (09-28-21 @ 01:33) (62 - 88)  BP: 98/60 (09-28-21 @ 01:33) (98/60 - 170/81)  RR: 17 (09-28-21 @ 01:33) (17 - 19)  SpO2: 99% (09-28-21 @ 01:33) (98% - 100%)

## 2021-09-29 VITALS
RESPIRATION RATE: 17 BRPM | TEMPERATURE: 98 F | DIASTOLIC BLOOD PRESSURE: 55 MMHG | OXYGEN SATURATION: 98 % | HEART RATE: 79 BPM | SYSTOLIC BLOOD PRESSURE: 115 MMHG

## 2021-09-29 LAB
ANION GAP SERPL CALC-SCNC: 12 MMOL/L — SIGNIFICANT CHANGE UP (ref 7–14)
BUN SERPL-MCNC: 17 MG/DL — SIGNIFICANT CHANGE UP (ref 7–23)
CALCIUM SERPL-MCNC: 9.5 MG/DL — SIGNIFICANT CHANGE UP (ref 8.4–10.5)
CHLORIDE SERPL-SCNC: 102 MMOL/L — SIGNIFICANT CHANGE UP (ref 98–107)
CO2 SERPL-SCNC: 25 MMOL/L — SIGNIFICANT CHANGE UP (ref 22–31)
CREAT SERPL-MCNC: 1.25 MG/DL — SIGNIFICANT CHANGE UP (ref 0.5–1.3)
GLUCOSE BLDC GLUCOMTR-MCNC: 106 MG/DL — HIGH (ref 70–99)
GLUCOSE SERPL-MCNC: 104 MG/DL — HIGH (ref 70–99)
HCT VFR BLD CALC: 43.4 % — SIGNIFICANT CHANGE UP (ref 39–50)
HGB BLD-MCNC: 14.7 G/DL — SIGNIFICANT CHANGE UP (ref 13–17)
MAGNESIUM SERPL-MCNC: 2.1 MG/DL — SIGNIFICANT CHANGE UP (ref 1.6–2.6)
MCHC RBC-ENTMCNC: 29.3 PG — SIGNIFICANT CHANGE UP (ref 27–34)
MCHC RBC-ENTMCNC: 33.9 GM/DL — SIGNIFICANT CHANGE UP (ref 32–36)
MCV RBC AUTO: 86.5 FL — SIGNIFICANT CHANGE UP (ref 80–100)
NRBC # BLD: 0 /100 WBCS — SIGNIFICANT CHANGE UP
NRBC # FLD: 0 K/UL — SIGNIFICANT CHANGE UP
PHOSPHATE SERPL-MCNC: 3.2 MG/DL — SIGNIFICANT CHANGE UP (ref 2.5–4.5)
PLATELET # BLD AUTO: 262 K/UL — SIGNIFICANT CHANGE UP (ref 150–400)
POTASSIUM SERPL-MCNC: 4.5 MMOL/L — SIGNIFICANT CHANGE UP (ref 3.5–5.3)
POTASSIUM SERPL-SCNC: 4.5 MMOL/L — SIGNIFICANT CHANGE UP (ref 3.5–5.3)
RBC # BLD: 5.02 M/UL — SIGNIFICANT CHANGE UP (ref 4.2–5.8)
RBC # FLD: 13.8 % — SIGNIFICANT CHANGE UP (ref 10.3–14.5)
SODIUM SERPL-SCNC: 139 MMOL/L — SIGNIFICANT CHANGE UP (ref 135–145)
WBC # BLD: 9.78 K/UL — SIGNIFICANT CHANGE UP (ref 3.8–10.5)
WBC # FLD AUTO: 9.78 K/UL — SIGNIFICANT CHANGE UP (ref 3.8–10.5)

## 2021-09-29 RX ORDER — METFORMIN HYDROCHLORIDE 850 MG/1
1 TABLET ORAL
Qty: 30 | Refills: 0
Start: 2021-09-29 | End: 2021-10-28

## 2021-09-29 RX ORDER — FAMOTIDINE 10 MG/ML
1 INJECTION INTRAVENOUS
Qty: 60 | Refills: 0
Start: 2021-09-29 | End: 2021-10-28

## 2021-09-29 RX ORDER — METFORMIN HYDROCHLORIDE 850 MG/1
1 TABLET ORAL
Qty: 0 | Refills: 0 | DISCHARGE

## 2021-09-29 RX ORDER — METFORMIN HYDROCHLORIDE 850 MG/1
1 TABLET ORAL
Qty: 30 | Refills: 0
Start: 2021-09-29 | End: 2021-11-02

## 2021-09-29 RX ORDER — ATORVASTATIN CALCIUM 80 MG/1
1 TABLET, FILM COATED ORAL
Qty: 30 | Refills: 0
Start: 2021-09-29 | End: 2021-11-02

## 2021-09-29 RX ORDER — ATENOLOL 25 MG/1
1 TABLET ORAL
Qty: 30 | Refills: 0
Start: 2021-09-29 | End: 2021-10-28

## 2021-09-29 RX ORDER — LOSARTAN POTASSIUM 100 MG/1
1 TABLET, FILM COATED ORAL
Qty: 30 | Refills: 0
Start: 2021-09-29 | End: 2021-10-28

## 2021-09-29 RX ORDER — ATENOLOL 25 MG/1
1 TABLET ORAL
Qty: 30 | Refills: 0
Start: 2021-09-29 | End: 2021-11-02

## 2021-09-29 RX ORDER — TICAGRELOR 90 MG/1
1 TABLET ORAL
Qty: 180 | Refills: 2
Start: 2021-09-29 | End: 2022-06-30

## 2021-09-29 RX ORDER — LOSARTAN POTASSIUM 100 MG/1
1 TABLET, FILM COATED ORAL
Qty: 30 | Refills: 0
Start: 2021-09-29 | End: 2021-11-02

## 2021-09-29 RX ORDER — ATORVASTATIN CALCIUM 80 MG/1
1 TABLET, FILM COATED ORAL
Qty: 30 | Refills: 0
Start: 2021-09-29 | End: 2021-10-28

## 2021-09-29 RX ORDER — ATENOLOL 25 MG/1
1 TABLET ORAL
Qty: 0 | Refills: 0 | DISCHARGE

## 2021-09-29 RX ORDER — TICAGRELOR 90 MG/1
1 TABLET ORAL
Qty: 60 | Refills: 0
Start: 2021-09-29 | End: 2021-10-28

## 2021-09-29 RX ORDER — ATORVASTATIN CALCIUM 80 MG/1
1 TABLET, FILM COATED ORAL
Qty: 0 | Refills: 0 | DISCHARGE

## 2021-09-29 RX ORDER — LOSARTAN POTASSIUM 100 MG/1
1 TABLET, FILM COATED ORAL
Qty: 0 | Refills: 0 | DISCHARGE

## 2021-09-29 RX ORDER — FAMOTIDINE 10 MG/ML
1 INJECTION INTRAVENOUS
Qty: 0 | Refills: 0 | DISCHARGE

## 2021-09-29 RX ADMIN — TICAGRELOR 90 MILLIGRAM(S): 90 TABLET ORAL at 05:12

## 2021-09-29 RX ADMIN — FAMOTIDINE 20 MILLIGRAM(S): 10 INJECTION INTRAVENOUS at 05:12

## 2021-09-29 NOTE — DISCHARGE NOTE NURSING/CASE MANAGEMENT/SOCIAL WORK - PATIENT PORTAL LINK FT
You can access the FollowMyHealth Patient Portal offered by [facility] by registering at the following website: [email or web address]. By joining [facility]’s FollowMyHealth portal, you will also be able to view your health information using other applications (apps) compatible with our system.

## 2022-07-26 RX ORDER — CLOPIDOGREL BISULFATE 75 MG/1
1 TABLET, FILM COATED ORAL
Qty: 90 | Refills: 3
Start: 2022-07-26 | End: 2023-07-20
